# Patient Record
Sex: FEMALE | Race: BLACK OR AFRICAN AMERICAN | URBAN - METROPOLITAN AREA
[De-identification: names, ages, dates, MRNs, and addresses within clinical notes are randomized per-mention and may not be internally consistent; named-entity substitution may affect disease eponyms.]

---

## 2017-03-01 ENCOUNTER — TELEPHONE (OUTPATIENT)
Dept: FAMILY MEDICINE CLINIC | Age: 57
End: 2017-03-01

## 2017-03-01 DIAGNOSIS — E78.5 HYPERLIPIDEMIA, UNSPECIFIED HYPERLIPIDEMIA TYPE: Primary | ICD-10-CM

## 2017-03-22 ENCOUNTER — OFFICE VISIT (OUTPATIENT)
Dept: FAMILY MEDICINE CLINIC | Age: 57
End: 2017-03-22

## 2017-03-22 VITALS
SYSTOLIC BLOOD PRESSURE: 120 MMHG | TEMPERATURE: 98.5 F | HEART RATE: 82 BPM | OXYGEN SATURATION: 99 % | HEIGHT: 67 IN | BODY MASS INDEX: 15.07 KG/M2 | RESPIRATION RATE: 16 BRPM | DIASTOLIC BLOOD PRESSURE: 79 MMHG | WEIGHT: 96 LBS

## 2017-03-22 DIAGNOSIS — J98.4 CHRONIC LUNG DISEASE: ICD-10-CM

## 2017-03-22 DIAGNOSIS — M24.811 INTERNAL DERANGEMENT OF SHOULDER, RIGHT: ICD-10-CM

## 2017-03-22 DIAGNOSIS — K27.9 PEPTIC ULCER DISEASE: Primary | ICD-10-CM

## 2017-03-22 DIAGNOSIS — Z12.11 ENCOUNTER FOR SCREENING COLONOSCOPY: ICD-10-CM

## 2017-03-22 DIAGNOSIS — C44.702: ICD-10-CM

## 2017-03-22 LAB — HGB BLD-MCNC: 12.2 G/DL

## 2017-03-22 RX ORDER — OMEPRAZOLE 40 MG/1
40 CAPSULE, DELAYED RELEASE ORAL DAILY
Qty: 30 CAP | Refills: 0 | Status: SHIPPED | OUTPATIENT
Start: 2017-03-22 | End: 2017-09-06 | Stop reason: SDUPTHER

## 2017-03-22 RX ORDER — HYDROXYZINE 25 MG/1
25 TABLET, FILM COATED ORAL
Qty: 30 TAB | Refills: 1 | Status: SHIPPED | OUTPATIENT
Start: 2017-03-22 | End: 2017-04-01

## 2017-03-22 NOTE — MR AVS SNAPSHOT
Visit Information Date & Time Provider Department Dept. Phone Encounter #  
 3/22/2017  3:45 PM Kenia Davis MD [address] [place] [address] [phone] [id number] Follow-up Instructions Return if symptoms worsen or fail to improve. Upcoming Health Maintenance Date Due Hepatitis C Screening 1960 DTaP/Tdap/Td series (1 - Tdap) 11/14/1979 PAP AKA CERVICAL CYTOLOGY 11/14/1979 BREAST CANCER SCRN MAMMOGRAM 11/14/2008 FOBT Q 1 YEAR AGE 50-75 11/14/2008 INFLUENZA AGE 9 TO ADULT 8/1/2016 Allergies as of 3/22/2017  Review Complete On: 3/22/2017 By: Kenia Davis MD  
  
 Severity Noted Reaction Type Reactions Penicillins  03/01/2016    Unknown (comments) Current Immunizations  Never Reviewed No immunizations on file. Not reviewed this visit You Were Diagnosed With   
  
 Codes Comments Peptic ulcer disease    -  Primary ICD-10-CM: K27.9 ICD-9-CM: 533.90 Chronic lung disease     ICD-10-CM: J98.4 ICD-9-CM: 518.89 Internal derangement of shoulder, right     ICD-10-CM: M24.811 ICD-9-CM: 719.81 Malignant neoplasm skin of leg, right     ICD-10-CM: C44.702 ICD-9-CM: 173.70 Vitals BP Pulse Temp Resp Height(growth percentile) Weight(growth percentile) 120/79 (BP 1 Location: Right arm, BP Patient Position: Sitting) 82 98.5 °F (36.9 °C) (Oral) 16 5' 7\" (1.702 m) 96 lb (43.5 kg) SpO2 BMI Smoking Status 99% 15.04 kg/m2 Current Every Day Smoker BMI and BSA Data Body Mass Index Body Surface Area 15.04 kg/m 2 1.43 m 2 Preferred Pharmacy Pharmacy Name Phone [facility] [address] [place] [address] [phone] Your Updated Medication List  
  
   
This list is accurate as of: 3/22/17  4:38 PM.  Always use your most recent med list.  
  
  
  
  
 albuterol 90 mcg/actuation inhaler Commonly known as:  PROVENTIL HFA, VENTOLIN HFA, PROAIR HFA Take  by inhalation. aspirin 325 mg tablet Commonly known as:  ASPIRIN Take 325 mg by mouth daily. atorvastatin 20 mg tablet Commonly known as:  LIPITOR Take 1 Tab by mouth daily. clotrimazole-betamethasone 1-0.05 % lotion Commonly known as:  Genny Roya Apply  to affected area two (2) times a day. cyclobenzaprine 10 mg tablet Commonly known as:  FLEXERIL Take 1 Tab by mouth three (3) times daily as needed for Muscle Spasm(s). hydrOXYzine HCl 25 mg tablet Commonly known as:  ATARAX Take  by mouth three (3) times daily as needed for Itching. ibuprofen 800 mg tablet Commonly known as:  MOTRIN Take  by mouth.  
  
 lidocaine 5 % Commonly known as:  Patrici Copas Apply 1-2 patches to the affected area for 12 hours a day and remove for 12 hours a day. lithium carbonate 300 mg capsule Take  by mouth three (3) times daily. omeprazole 40 mg capsule Commonly known as:  PRILOSEC Take 1 Cap by mouth two (2) times a day. oxyCODONE-acetaminophen 5-325 mg per tablet Commonly known as:  PERCOCET Take 1 Tab by mouth every eight (8) hours as needed for Pain. Max Daily Amount: 3 Tabs. Indications: PAIN We Performed the Following REFERRAL TO DERMATOLOGY [REF19 Custom] Comments:  
 Please evaluate patient for malignant skin lesion. REFERRAL TO GASTROENTEROLOGY [RQB62 Custom] Comments:  
 Please evaluate patient for peptic ulcer disease. REFERRAL TO PHYSICAL THERAPY [AJV75 Custom] Comments:  
 Please evaluate patient for shoulder derangement. REFERRAL TO THORACIC (NON-CARDIAC) SURGERY [GAF086 Custom] Comments:  
 Please evaluate patient for chronic lung disease. Follow-up Instructions Return if symptoms worsen or fail to improve. Referral Information Referral ID Referred By Referred To [id number] Benedict Jackson MD   
   [address] [place], [address] Phone: [phone] Fax: [phone] Visits Status Start Date End Date 4 New Request 3/22/17 3/22/18 If your referral has a status of pending review or denied, additional information will be sent to support the outcome of this decision. Referral ID Referred By Referred To  
 [id number] Wyn Goltz, MD  
   [address] [place] [address] [facility] [place] [address] Phone: [phone] Fax: [phone] Visits Status Start Date End Date 4 New Request 3/22/17 3/22/18 If your referral has a status of pending review or denied, additional information will be sent to support the outcome of this decision. Referral ID Referred By Referred To  
 [id number] Martine Jona, [address] [place], [address] [facility] Phone: [phone] Fax: [phone] Visits Status Start Date End Date 3 New Request 3/22/17 3/22/18 If your referral has a status of pending review or denied, additional information will be sent to support the outcome of this decision. Referral ID Referred By Referred To  
 [id number] [facility] [address] [facility]  
   [address] [place], [address] [facility] Phone: [phone] Fax: [phone] Visits Status Start Date End Date  
 7 New Request 3/22/17 3/22/18 If your referral has a status of pending review or denied, additional information will be sent to support the outcome of this decision. Introducing [facility]! Dear Rc Gillette: 
Thank you for requesting a Notis.tv account. Our records indicate that you already have an active [email or web address] account. You can access your account anytime at [email or web address] Did you know that you can access your hospital and ER discharge instructions at any time in [employer]? You can also review all of your test results from your hospital stay or ER visit. Additional Information If you have questions, please visit the Frequently Asked Questions section of the [employer] website at [email or web address] Remember, [employer] is NOT to be used for urgent needs. For medical emergencies, dial 911. Now available from your iPhone and Android! Please provide this summary of care documentation to your next provider. Your primary care clinician is listed as Jaycob Wilcox. If you have any questions after today's visit, please call [phone].

## 2017-03-22 NOTE — PROGRESS NOTES
HISTORY OF PRESENT ILLNESS  Amanda Pollack is a 60 y.o. female. HPI  Chronic lung disease due for pulmonary eval  Ulcerated lesion in duodenum, has not seen GI yet  C/o skin lesion r leg  Shoulder derangement r in pain  Review of Systems   Constitutional: Positive for weight loss. Gastrointestinal: Positive for abdominal pain and nausea. Musculoskeletal: Positive for joint pain. All other systems reviewed and are negative. Past Medical History:   Diagnosis Date    Depression     Stroke ([facility] [address] 04/15       Current Outpatient Prescriptions:     atorvastatin (LIPITOR) 20 mg tablet, Take 1 Tab by mouth daily. , Disp: , Rfl: 5    omeprazole (PRILOSEC) 40 mg capsule, Take 1 Cap by mouth two (2) times a day., Disp: 180 Cap, Rfl: 3    cyclobenzaprine (FLEXERIL) 10 mg tablet, Take 1 Tab by mouth three (3) times daily as needed for Muscle Spasm(s). , Disp: 90 Tab, Rfl: 0    lidocaine (LIDODERM) 5 %, Apply 1-2 patches to the affected area for 12 hours a day and remove for 12 hours a day., Disp: 60 Each, Rfl: 3    ibuprofen (MOTRIN) 800 mg tablet, Take  by mouth., Disp: , Rfl:     clotrimazole-betamethasone (LOTRISONE) 1-0.05 % lotion, Apply  to affected area two (2) times a day., Disp: , Rfl:     albuterol (PROVENTIL HFA, VENTOLIN HFA, PROAIR HFA) 90 mcg/actuation inhaler, Take  by inhalation. , Disp: , Rfl:     aspirin (ASPIRIN) 325 mg tablet, Take 325 mg by mouth daily. , Disp: , Rfl:     lithium carbonate 300 mg capsule, Take  by mouth three (3) times daily. , Disp: , Rfl:     hydrOXYzine (ATARAX) 25 mg tablet, Take  by mouth three (3) times daily as needed for Itching., Disp: , Rfl:     oxyCODONE-acetaminophen (PERCOCET) 5-325 mg per tablet, Take 1 Tab by mouth every eight (8) hours as needed for Pain. Max Daily Amount: 3 Tabs.  Indications: PAIN, Disp: 50 Tab, Rfl: 0  Visit Vitals    /79 (BP 1 Location: Right arm, BP Patient Position: Sitting)    Pulse 82    Temp 98.5 °F (36.9 °C) (Oral)    Resp 16    Ht 5' 7\" (1.702 m)    Wt 96 lb (43.5 kg)    SpO2 99%    BMI 15.04 kg/m2         Physical Exam   Constitutional: She appears well-developed and well-nourished. No distress. Cardiovascular: Normal rate, normal heart sounds and intact distal pulses. Exam reveals no gallop and no friction rub. No murmur heard. Pulmonary/Chest: Effort normal and breath sounds normal. No respiratory distress. She has no wheezes. She has no rales. She exhibits no tenderness. Abdominal: Soft. Bowel sounds are normal. She exhibits no distension and no mass. There is tenderness. There is guarding. There is no rebound. Tenderness and guarding upper abdomen   Skin: Skin is warm and dry. No rash noted. She is not diaphoretic. No erythema. No pallor.    Complex quarter sized lesion on r leg  Suspicious for malignancy   Vitals reviewed.  hgb-    ASSESSMENT and PLAN  chronic lung disease   Peptic ulcer disease  Malignant skin lesion  Shoulder derangement  Plan  Refer to PT  Refer to GI  Refer to thoracic  Omeprazole 40 mg  Refer to derm

## 2017-03-22 NOTE — PROGRESS NOTES
Amanda Steiner is a 60 y.o. female who presents today for annual wellness exam. Pain scale 0/10    Health Maintenance Due   Topic Date Due    Hepatitis C Screening  1960    Pneumococcal 19-64 Medium Risk (1 of 1 - PPSV23) 11/14/1977    DTaP/Tdap/Td series (1 - Tdap) 11/14/1979    PAP AKA CERVICAL CYTOLOGY  11/14/1979    BREAST CANCER SCRN MAMMOGRAM  11/14/2008    FOBT Q 1 YEAR AGE 50-75  11/14/2008    INFLUENZA AGE 9 TO ADULT  08/01/2016         1. Have you been to the ER, urgent care clinic since your last visit? Hospitalized since your last visit? No    2. Have you seen or consulted any other health care providers outside of the [address] [place] [address] since your last visit? Include any pap smears or colon screening. No    Learning Assessment 3/1/2016   PRIMARY LEARNER Patient   HIGHEST LEVEL OF EDUCATION - PRIMARY LEARNER  SOME COLLEGE   BARRIERS PRIMARY LEARNER NONE   CO-LEARNER CAREGIVER No   PRIMARY LANGUAGE ENGLISH   LEARNER PREFERENCE PRIMARY DEMONSTRATION   ANSWERED BY patient   RELATIONSHIP SELF       Abuse Screening Questionnaire 3/1/2016   Do you ever feel afraid of your partner? N   Are you in a relationship with someone who physically or mentally threatens you? N   Is it safe for you to go home?  Molly Blackwood

## 2017-03-23 ENCOUNTER — TELEPHONE (OUTPATIENT)
Dept: FAMILY MEDICINE CLINIC | Age: 57
End: 2017-03-23

## 2017-03-23 ENCOUNTER — HOSPITAL ENCOUNTER (OUTPATIENT)
Dept: LAB | Age: 57
Discharge: HOME | End: 2017-03-23
Payer: MEDICARE

## 2017-03-23 DIAGNOSIS — E78.5 HYPERLIPIDEMIA, UNSPECIFIED HYPERLIPIDEMIA TYPE: ICD-10-CM

## 2017-03-23 LAB
ALBUMIN SERPL BCP-MCNC: 4.1 G/DL (ref 3.4–5)
ALBUMIN/GLOB SERPL: 1.4 {RATIO} (ref 0.8–1.7)
ALP SERPL-CCNC: 81 U/L (ref 45–117)
ALT SERPL-CCNC: 20 U/L (ref 13–56)
ANION GAP BLD CALC-SCNC: 6 MMOL/L (ref 3–18)
AST SERPL W P-5'-P-CCNC: 18 U/L (ref 15–37)
BASOPHILS # BLD AUTO: 0.1 K/UL (ref 0–0.06)
BASOPHILS # BLD: 1 % (ref 0–2)
BILIRUB SERPL-MCNC: 0.4 MG/DL (ref 0.2–1)
BUN SERPL-MCNC: 9 MG/DL (ref 7–18)
BUN/CREAT SERPL: 17 (ref 12–20)
CALCIUM SERPL-MCNC: 8.9 MG/DL (ref 8.5–10.1)
CHLORIDE SERPL-SCNC: 105 MMOL/L (ref 100–108)
CHOLEST SERPL-MCNC: 201 MG/DL
CO2 SERPL-SCNC: 30 MMOL/L (ref 21–32)
CREAT SERPL-MCNC: 0.54 MG/DL (ref 0.6–1.3)
DIFFERENTIAL METHOD BLD: ABNORMAL
EOSINOPHIL # BLD: 0 K/UL (ref 0–0.4)
EOSINOPHIL NFR BLD: 1 % (ref 0–5)
ERYTHROCYTE [DISTWIDTH] IN BLOOD BY AUTOMATED COUNT: 13.7 % (ref 11.6–14.5)
GLOBULIN SER CALC-MCNC: 2.9 G/DL (ref 2–4)
GLUCOSE SERPL-MCNC: 80 MG/DL (ref 74–99)
HCT VFR BLD AUTO: 38.4 % (ref 35–45)
HDLC SERPL-MCNC: 77 MG/DL (ref 40–60)
HDLC SERPL: 2.6 {RATIO} (ref 0–5)
HGB BLD-MCNC: 12.7 G/DL (ref 12–16)
LDLC SERPL CALC-MCNC: 110.6 MG/DL (ref 0–100)
LIPID PROFILE,FLP: ABNORMAL
LYMPHOCYTES # BLD AUTO: 42 % (ref 21–52)
LYMPHOCYTES # BLD: 2.2 K/UL (ref 0.9–3.6)
MCH RBC QN AUTO: 32.2 PG (ref 24–34)
MCHC RBC AUTO-ENTMCNC: 33.1 G/DL (ref 31–37)
MCV RBC AUTO: 97.5 FL (ref 74–97)
MONOCYTES # BLD: 0.4 K/UL (ref 0.05–1.2)
MONOCYTES NFR BLD AUTO: 7 % (ref 3–10)
NEUTS SEG # BLD: 2.6 K/UL (ref 1.8–8)
NEUTS SEG NFR BLD AUTO: 49 % (ref 40–73)
PLATELET # BLD AUTO: 359 K/UL (ref 135–420)
PMV BLD AUTO: 9.1 FL (ref 9.2–11.8)
POTASSIUM SERPL-SCNC: 4.3 MMOL/L (ref 3.5–5.5)
PROT SERPL-MCNC: 7 G/DL (ref 6.4–8.2)
PSA SERPL-MCNC: <0.1 NG/ML
RBC # BLD AUTO: 3.94 M/UL (ref 4.2–5.3)
SODIUM SERPL-SCNC: 141 MMOL/L (ref 136–145)
T4 FREE SERPL-MCNC: 0.9 NG/DL (ref 0.7–1.5)
TRIGL SERPL-MCNC: 67 MG/DL (ref ?–150)
TSH SERPL DL<=0.05 MIU/L-ACNC: 0.99 UIU/ML (ref 0.36–3.74)
VLDLC SERPL CALC-MCNC: 13.4 MG/DL
WBC # BLD AUTO: 5.3 K/UL (ref 4.6–13.2)

## 2017-03-23 PROCEDURE — 85025 COMPLETE CBC W/AUTO DIFF WBC: CPT | Performed by: INTERNAL MEDICINE

## 2017-03-23 PROCEDURE — 80053 COMPREHEN METABOLIC PANEL: CPT | Performed by: INTERNAL MEDICINE

## 2017-03-23 PROCEDURE — 80061 LIPID PANEL: CPT | Performed by: INTERNAL MEDICINE

## 2017-03-23 PROCEDURE — 84439 ASSAY OF FREE THYROXINE: CPT | Performed by: INTERNAL MEDICINE

## 2017-03-23 PROCEDURE — 84443 ASSAY THYROID STIM HORMONE: CPT | Performed by: INTERNAL MEDICINE

## 2017-03-23 PROCEDURE — 36415 COLL VENOUS BLD VENIPUNCTURE: CPT | Performed by: INTERNAL MEDICINE

## 2017-03-23 PROCEDURE — 84153 ASSAY OF PSA TOTAL: CPT | Performed by: INTERNAL MEDICINE

## 2017-03-23 NOTE — TELEPHONE ENCOUNTER
[facility] called stating they received an order that states \"evaluate for pulmonary disease\" she is wondering if this was in error or if there is something specific Dr. Philomena Chowdhury thought surgery could do.     Carolina Power [phone]

## 2017-03-27 ENCOUNTER — TELEPHONE (OUTPATIENT)
Dept: FAMILY MEDICINE CLINIC | Age: 57
End: 2017-03-27

## 2017-04-03 ENCOUNTER — TELEPHONE (OUTPATIENT)
Dept: FAMILY MEDICINE CLINIC | Age: 57
End: 2017-04-03

## 2017-04-03 NOTE — TELEPHONE ENCOUNTER
Pt called again stating she is vomiting blood. Stated she cannot wait in the ER. Advised to call 911. Pt stated she is going to drive herself as an ambulance is \"embarassing\". Advised we do not have control over the possible wait time in the ER.

## 2017-08-16 ENCOUNTER — TELEPHONE (OUTPATIENT)
Dept: FAMILY MEDICINE CLINIC | Age: 57
End: 2017-08-16

## 2017-08-16 DIAGNOSIS — E55.9 VITAMIN D DEFICIENCY: ICD-10-CM

## 2017-08-16 DIAGNOSIS — E78.5 HYPERLIPIDEMIA, UNSPECIFIED: Primary | ICD-10-CM

## 2017-08-16 NOTE — TELEPHONE ENCOUNTER
Pt has a mwv scheduled 8/22 and is requesting her bw to be ordered before her appt.  Please review and order accordingly

## 2017-09-06 RX ORDER — CYCLOBENZAPRINE HCL 10 MG
10 TABLET ORAL
Qty: 90 TAB | Refills: 0 | Status: SHIPPED | OUTPATIENT
Start: 2017-09-06 | End: 2017-10-01 | Stop reason: SDUPTHER

## 2017-09-06 RX ORDER — OMEPRAZOLE 40 MG/1
40 CAPSULE, DELAYED RELEASE ORAL DAILY
Qty: 30 CAP | Refills: 0 | Status: SHIPPED | OUTPATIENT
Start: 2017-09-06 | End: 2018-03-08 | Stop reason: SDUPTHER

## 2017-09-06 NOTE — TELEPHONE ENCOUNTER
From: Jazmin Bach  To: Catina Parikh MD  Sent: 9/6/2017 11:38 AM EDT  Subject: Medication Renewal Request    Original authorizing provider: MD Jazmin Vance would like a refill of the following medications:  cyclobenzaprine (FLEXERIL) 10 mg tablet Catina Parikh MD]  omeprazole (PRILOSEC) 40 mg capsule Catina Parikh MD]    Preferred pharmacy: [address]    Comment:

## 2017-10-01 DIAGNOSIS — M25.812 OS ACROMIALE, LEFT: ICD-10-CM

## 2017-10-01 DIAGNOSIS — M75.52 SHOULDER BURSITIS, LEFT: ICD-10-CM

## 2017-10-01 DIAGNOSIS — M75.42 SHOULDER IMPINGEMENT SYNDROME, LEFT: ICD-10-CM

## 2017-10-01 RX ORDER — OXYCODONE AND ACETAMINOPHEN 5; 325 MG/1; MG/1
1 TABLET ORAL
Qty: 50 TAB | Refills: 0 | Status: CANCELLED | OUTPATIENT
Start: 2017-10-01

## 2017-10-02 RX ORDER — LIDOCAINE 50 MG/G
PATCH TOPICAL
Qty: 60 EACH | Refills: 3 | Status: SHIPPED | OUTPATIENT
Start: 2017-10-02

## 2017-10-02 RX ORDER — CYCLOBENZAPRINE HCL 10 MG
10 TABLET ORAL
Qty: 90 TAB | Refills: 0 | Status: SHIPPED | OUTPATIENT
Start: 2017-10-02 | End: 2017-11-02 | Stop reason: SDUPTHER

## 2017-10-02 NOTE — TELEPHONE ENCOUNTER
From: Olimpia Grimes  To: Austin Gillette MD  Sent: 10/1/2017 2:32 PM EDT  Subject: Medication Renewal Request    Original authorizing provider: MD Olimpia York would like a refill of the following medications:  lidocaine (LIDODERM) 5 % Austin Gillette MD]  cyclobenzaprine (FLEXERIL) 10 mg tablet Austin Gillette MD]    Preferred pharmacy: [address]    Comment:      Medication renewals requested in this message routed to other providers:  oxyCODONE-acetaminophen (PERCOCET) 5-325 mg per tablet Eden Hernandez MD]

## 2017-10-05 RX ORDER — IBUPROFEN 800 MG/1
800 TABLET ORAL
Qty: 90 TAB | Refills: 3 | Status: SHIPPED | OUTPATIENT
Start: 2017-10-05

## 2017-10-05 NOTE — TELEPHONE ENCOUNTER
From: Cindy Cutler  Sent: 10/1/2017 2:32 PM EDT  Subject: Medication Renewal Request    Original authorizing provider: MD Cindy Sales would like a refill of the following medications:  oxyCODONE-acetaminophen (PERCOCET) 5-325 mg per tablet Rob Paz MD]    Preferred pharmacy: [address]    Comment:      Medication renewals requested in this message routed to other providers:  lidocaine (LIDODERM) 5 % Keisha Capellan MD]  cyclobenzaprine (FLEXERIL) 10 mg tablet Keisha Capellan MD]

## 2017-10-13 DIAGNOSIS — M75.40 IMPINGEMENT SYNDROME OF SHOULDER REGION, UNSPECIFIED LATERALITY: Primary | ICD-10-CM

## 2017-11-02 RX ORDER — CYCLOBENZAPRINE HCL 10 MG
TABLET ORAL
Qty: 90 TAB | Refills: 0 | Status: SHIPPED | OUTPATIENT
Start: 2017-11-02

## 2018-03-08 RX ORDER — HYDROXYZINE 25 MG/1
25 TABLET, FILM COATED ORAL
Qty: 90 TAB | Refills: 0 | Status: SHIPPED | OUTPATIENT
Start: 2018-03-08 | End: 2018-08-27 | Stop reason: SDUPTHER

## 2018-03-08 RX ORDER — OMEPRAZOLE 40 MG/1
40 CAPSULE, DELAYED RELEASE ORAL DAILY
Qty: 90 CAP | Refills: 0 | Status: SHIPPED | OUTPATIENT
Start: 2018-03-08 | End: 2018-05-14 | Stop reason: SDUPTHER

## 2018-05-01 ENCOUNTER — TELEPHONE (OUTPATIENT)
Dept: FAMILY MEDICINE CLINIC | Age: 58
End: 2018-05-01

## 2018-05-01 NOTE — TELEPHONE ENCOUNTER
Patient called office, she was at [facility] with weakness in her R arm x 2 days wishing to be seen, she was told there would be a wait and she contacted the office. Patient drove herself and was advised by me to not drive and to stay and wait for the physician. She stated she was going home, I offered her to be seen by me if she could safely get to the office the PM, but she refused and restated she was going home.   Patient was encouraged to contact the office in the AM and I would be happy to see her Wednesday

## 2018-05-01 NOTE — TELEPHONE ENCOUNTER
Pt called us while she was in the emergency room at [facility] because she believes she is having a stroke due to right arm numbness. She wanted to talk to Dr Sapphire Johnson because they Charles Freedman making me wait\"     I spoke to the nurse and she advised me to tell the pt to stay at the emergency room since she thinks she is having stroke-like symptoms. I informed pt of this and she became argumentative stating how they want her to wait \"with 50 other people! \" and she wanted us to call them and tell them that she is in the parking lot waiting. I advised the patient that she needed to stay in the emergency room and she refused demanding to speak with Dr Sapphire Johnson. I offered to leave a message and have him call her back because he is seeing patients but she refused and said that she would wait on hold and is still waiting.

## 2018-05-02 ENCOUNTER — TELEPHONE (OUTPATIENT)
Dept: FAMILY MEDICINE CLINIC | Age: 58
End: 2018-05-02

## 2018-05-02 NOTE — TELEPHONE ENCOUNTER
Patient called requesting that we call [facility] emergency room and tell them to take her back immediately when she arrives to the ER for stroke symptoms. I advised patient that we can not force the emergency room to evaluate her on the spot that she needs to check in and they will assess the urgency and decide if she needs to be taken back immediately or not. Pt states \"I don't give a damn, he is my doctor he needs to call them and tell them to take me back so I do not have to sit in the waiting room with 25 other people\"   I advised again, that we do not have control over the emergency room. She stated \"I am so done with this\" and disconnected the call.

## 2018-05-02 NOTE — TELEPHONE ENCOUNTER
Per notes: 5/1/18    Today, 5/2/18, pt called stating she believes she is having a stroke due to seeing sparkling with sight. I then was advised by Gris Sotelo LPN to let pt know to go to the emergency room since she is experiencing stroke like symptoms. Pt then asked if she could be seen right away or is she to wait with 40 people like she did on yesterday. I then advised the pt since she is experiencing stroke like symptoms and her PCP requested she'd be seen the emergency room may take her back right away, that I wasn't sure. Pt started to become upset in which I called Dominique and advised her on the concern. Pt was then transferred to Gris Sotelo to advise pt on concern.

## 2018-05-03 NOTE — TELEPHONE ENCOUNTER
Checked care everywhere and patient did go to [facility] ER yesterday and was seen. They ran EKG, xrays, and CT of her head.  They did find some changes compared to CT from 2015

## 2018-05-08 ENCOUNTER — PATIENT OUTREACH (OUTPATIENT)
Dept: FAMILY MEDICINE CLINIC | Age: 58
End: 2018-05-08

## 2018-05-09 ENCOUNTER — PATIENT OUTREACH (OUTPATIENT)
Dept: FAMILY MEDICINE CLINIC | Age: 58
End: 2018-05-09

## 2018-05-09 NOTE — Clinical Note
Patient admitted to [facility] 5/2-4/2018 did leave the ED on 5/2 to return again after midnight for ; Acute CVA Cocaine use SHe made the f/u appointment.  I tried calling her no answer so for now it will be just a hospital follow up on 5/11 @ 2:00 Smoking Alcohol intoxication

## 2018-05-09 NOTE — PROGRESS NOTES
.  Hospital Discharge Follow-Up      Date/Time:  5/9/2018 12:22 PM    Patient was admitted to [facility] on 5/3 and discharged on 5/4/2018 for CVA. The physician discharge summary was available at the time of outreach. This was NN second attempt to reach patient for MOSES ROBINA  Case management assessment, had to leave a message as she did say her full name on the answering recording. Patient did call the office for a hospital visit appointment it is set up for 5/11 at 2 pm. NN did leave that information as well on her machine. Top Challenges reviewed with the provider   Acute CVA  Cocaine use  Smoking  Alcohol intoxication         Method of communication with provider :chart routing    Inpatient RRAT score: .5 low    Was this a readmission? no   Patient stated reason for the readmission: N/A    Barriers to care? Substance abuse, Smoking, Alcohol and cocaine,, depression, level of motivation    Advance Care Planning:   Does patient have an Advance Directive:  not on file       Medication reconciliation needs to be completed         Current Outpatient Prescriptions   Medication Sig    omeprazole (PRILOSEC) 40 mg capsule Take 1 Cap by mouth daily.  hydrOXYzine HCl (ATARAX) 25 mg tablet Take 1 Tab by mouth three (3) times daily as needed for Itching.  cyclobenzaprine (FLEXERIL) 10 mg tablet TAKE ONE TABLET BY MOUTH THREE TIMES DAILY AS NEEDED FOR MUSCLE SPASM     ibuprofen (MOTRIN) 800 mg tablet Take 1 Tab by mouth every eight (8) hours as needed for Pain.  lidocaine (LIDODERM) 5 % Apply 1-2 patches to the affected area for 12 hours a day and remove for 12 hours a day.  atorvastatin (LIPITOR) 20 mg tablet Take 1 Tab by mouth daily.  oxyCODONE-acetaminophen (PERCOCET) 5-325 mg per tablet Take 1 Tab by mouth every eight (8) hours as needed for Pain. Max Daily Amount: 3 Tabs. Indications: PAIN    omeprazole (PRILOSEC) 40 mg capsule Take 1 Cap by mouth two (2) times a day.     clotrimazole-betamethasone (LOTRISONE) 1-0.05 % lotion Apply  to affected area two (2) times a day.  albuterol (PROVENTIL HFA, VENTOLIN HFA, PROAIR HFA) 90 mcg/actuation inhaler Take  by inhalation.  aspirin (ASPIRIN) 325 mg tablet Take 325 mg by mouth daily.  lithium carbonate 300 mg capsule Take  by mouth three (3) times daily. No current facility-administered medications for this visit. There are no discontinued medications.     PCP/Specialist follow up: Future Appointments  Date Time Provider Rex Hidalgo   5/11/2018 2:00 PM Favio Serrano MD [facility]

## 2018-05-14 ENCOUNTER — OFFICE VISIT (OUTPATIENT)
Dept: FAMILY MEDICINE CLINIC | Age: 58
End: 2018-05-14

## 2018-05-14 VITALS
OXYGEN SATURATION: 96 % | TEMPERATURE: 98.8 F | HEIGHT: 67 IN | RESPIRATION RATE: 16 BRPM | BODY MASS INDEX: 13.94 KG/M2 | SYSTOLIC BLOOD PRESSURE: 107 MMHG | WEIGHT: 88.8 LBS | DIASTOLIC BLOOD PRESSURE: 70 MMHG | HEART RATE: 79 BPM

## 2018-05-14 DIAGNOSIS — I69.359 CVA, OLD, HEMIPARESIS (HCC): Primary | ICD-10-CM

## 2018-05-14 DIAGNOSIS — Z72.0 TOBACCO USE: ICD-10-CM

## 2018-05-14 DIAGNOSIS — R29.898 RIGHT ARM WEAKNESS: ICD-10-CM

## 2018-05-14 DIAGNOSIS — J43.8 OTHER EMPHYSEMA (HCC): ICD-10-CM

## 2018-05-14 DIAGNOSIS — Q21.12 PATENT FORAMEN OVALE: ICD-10-CM

## 2018-05-14 PROBLEM — K27.9 PEPTIC ULCER DISEASE: Status: ACTIVE | Noted: 2018-05-14

## 2018-05-14 RX ORDER — VARENICLINE TARTRATE 1 MG/1
1 TABLET, FILM COATED ORAL 2 TIMES DAILY
Qty: 60 TAB | Refills: 2 | Status: SHIPPED | OUTPATIENT
Start: 2018-05-14

## 2018-05-14 RX ORDER — OMEPRAZOLE 40 MG/1
40 CAPSULE, DELAYED RELEASE ORAL DAILY
Qty: 90 CAP | Refills: 0 | Status: SHIPPED | OUTPATIENT
Start: 2018-05-14

## 2018-05-14 RX ORDER — ASPIRIN 81 MG/1
162 TABLET ORAL DAILY
Qty: 100 TAB | Refills: 3
Start: 2018-05-14

## 2018-05-14 NOTE — PROGRESS NOTES
Amanda Coppola is a 59 y.o. female (: 1960) presenting to address:    Chief Complaint   Patient presents with   [facility] Follow Up     pain scale 0/10       Vitals:    18 1109   BP: 107/70   Pulse: 79   Resp: 16   Temp: 98.8 °F (37.1 °C)   TempSrc: Oral   SpO2: 96%   Weight: 88 lb 12.8 oz (40.3 kg)   Height: 5' 7\" (1.702 m)   PainSc:   0 - No pain       Hearing/Vision:   No exam data present    Learning Assessment:     Learning Assessment 3/1/2016   PRIMARY LEARNER Patient   HIGHEST LEVEL OF EDUCATION - PRIMARY LEARNER  SOME COLLEGE   BARRIERS PRIMARY LEARNER NONE   CO-LEARNER CAREGIVER No   PRIMARY LANGUAGE ENGLISH   LEARNER PREFERENCE PRIMARY DEMONSTRATION   ANSWERED BY patient   RELATIONSHIP SELF     Depression Screening:     PHQ over the last two weeks 2018   PHQ Not Done Active Diagnosis of Depression or Bipolar Disorder   Little interest or pleasure in doing things -   Feeling down, depressed or hopeless -   Total Score PHQ 2 -     Fall Risk Assessment:   No flowsheet data found. Abuse Screening:     Abuse Screening Questionnaire 3/1/2016   Do you ever feel afraid of your partner? N   Are you in a relationship with someone who physically or mentally threatens you? N   Is it safe for you to go home? Y     Coordination of Care Questionaire:   1. Have you been to the ER, urgent care clinic since your last visit? Hospitalized since your last visit? YES ana    2. Have you seen or consulted any other health care providers outside of the [address] [place] [address] since your last visit? Include any pap smears or colon screening. YES podiatrist    Advanced Directive:   1. Do you have an Advanced Directive? NO    2. Would you like information on Advanced Directives?  NO

## 2018-05-14 NOTE — PROGRESS NOTES
Discussed the patient's BMI with her. The BMI follow up plan is as follows:     weight gain advised  nutrition/feeding management  dietary management education, guidance, and counseling    An After Visit Summary was printed and given to the patient.

## 2018-05-14 NOTE — PROGRESS NOTES
HISTORY OF PRESENT ILLNESS  Amanda Mcneil is a 59 y.o. female. HPI  Recent hospitalization for cva  Presented with r arm weakness and some scotomas  r arm weakness persists  Copd stable  Review of Systems   Neurological: Positive for focal weakness. All other systems reviewed and are negative. Past Medical History:   Diagnosis Date    Depression     Stroke [facility]) 04/15     Current Outpatient Prescriptions on File Prior to Visit   Medication Sig Dispense Refill    omeprazole (PRILOSEC) 40 mg capsule Take 1 Cap by mouth daily. 90 Cap 0    hydrOXYzine HCl (ATARAX) 25 mg tablet Take 1 Tab by mouth three (3) times daily as needed for Itching. 90 Tab 0    cyclobenzaprine (FLEXERIL) 10 mg tablet TAKE ONE TABLET BY MOUTH THREE TIMES DAILY AS NEEDED FOR MUSCLE SPASM  90 Tab 0    ibuprofen (MOTRIN) 800 mg tablet Take 1 Tab by mouth every eight (8) hours as needed for Pain. 90 Tab 3    lidocaine (LIDODERM) 5 % Apply 1-2 patches to the affected area for 12 hours a day and remove for 12 hours a day. 60 Each 3    atorvastatin (LIPITOR) 20 mg tablet Take 1 Tab by mouth daily. 5    oxyCODONE-acetaminophen (PERCOCET) 5-325 mg per tablet Take 1 Tab by mouth every eight (8) hours as needed for Pain. Max Daily Amount: 3 Tabs. Indications: PAIN 50 Tab 0    omeprazole (PRILOSEC) 40 mg capsule Take 1 Cap by mouth two (2) times a day. 180 Cap 3    clotrimazole-betamethasone (LOTRISONE) 1-0.05 % lotion Apply  to affected area two (2) times a day.  albuterol (PROVENTIL HFA, VENTOLIN HFA, PROAIR HFA) 90 mcg/actuation inhaler Take  by inhalation.  aspirin (ASPIRIN) 325 mg tablet Take 325 mg by mouth daily.  lithium carbonate 300 mg capsule Take  by mouth three (3) times daily. No current facility-administered medications on file prior to visit.       Visit Vitals    /70 (BP 1 Location: Right arm, BP Patient Position: Sitting)    Pulse 79    Temp 98.8 °F (37.1 °C) (Oral)    Resp 16    Ht 5' 7\" (1.702 m)    Wt 88 lb 12.8 oz (40.3 kg)    SpO2 96%    BMI 13.91 kg/m2       Physical Exam   Constitutional: She is oriented to person, place, and time. She appears well-developed and well-nourished. No distress. Eyes: Conjunctivae and EOM are normal. Pupils are equal, round, and reactive to light. No scleral icterus. Neck: Normal range of motion. Neck supple. No thyromegaly present. Carotid upstroke normal  - bruits   Cardiovascular: Normal rate, regular rhythm, normal heart sounds and intact distal pulses. Exam reveals no gallop and no friction rub. No murmur heard. Pulmonary/Chest: Effort normal. No respiratory distress. She has no wheezes. She has no rales. She exhibits no tenderness. Musculoskeletal: Normal range of motion. She exhibits no edema, tenderness or deformity. Gait and balance normal   Lymphadenopathy:     She has no cervical adenopathy. Neurological: She is alert and oriented to person, place, and time. She displays abnormal reflex. No cranial nerve deficit. She exhibits normal muscle tone. Coordination normal.   dtr decreased on R arm  --dysmetria  Slightly weak r hand    Skin: Skin is warm and dry. No rash noted. She is not diaphoretic. No erythema. No pallor. Psychiatric: She has a normal mood and affect. Her behavior is normal. Judgment and thought content normal.   Vitals reviewed.   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

## 2018-05-14 NOTE — MR AVS SNAPSHOT
[address] [address] [facility] [id number] [phone] Patient: Vangie Guevara MRN: [id number] [facility]:40/54/1225 Visit Information Date & Time Provider Department Dept. Phone Encounter #  
 5/14/2018 11:15 AM Aleida Graf, [address] [place] [phone] [id number] Follow-up Instructions Return pending outcomes. Follow-up and Disposition History Upcoming Health Maintenance Date Due Hepatitis C Screening 1960 DTaP/Tdap/Td series (1 - Tdap) 11/14/1979 PAP AKA CERVICAL CYTOLOGY 11/14/1979 BREAST CANCER SCRN MAMMOGRAM 11/14/2008 FOBT Q 1 YEAR AGE 50-75 11/14/2008 MEDICARE YEARLY EXAM 3/20/2018 Influenza Age 5 to Adult 8/1/2018 Allergies as of 5/14/2018  Review Complete On: 5/14/2018 By: Aleida Graf MD  
  
 Severity Noted Reaction Type Reactions Penicillins  03/01/2016    Unknown (comments) Current Immunizations  Never Reviewed No immunizations on file. Not reviewed this visit You Were Diagnosed With   
  
 Codes Comments CVA, old, hemiparesis ([facility] [address]    -  Primary ICD-10-CM: D35.746 ICD-9-CM: 438.20 Other emphysema ([facility] [address]     ICD-10-CM: J43.8 ICD-9-CM: 492.8 Tobacco use     ICD-10-CM: Z72.0 ICD-9-CM: 305.1 Right arm weakness     ICD-10-CM: R29.898 ICD-9-CM: 729.89 Patent foramen ovale     ICD-10-CM: Q21.1 ICD-9-CM: 690. 5 Vitals BP Pulse Temp Resp Height(growth percentile) Weight(growth percentile) 107/70 (BP 1 Location: Right arm, BP Patient Position: Sitting) 79 98.8 °F (37.1 °C) (Oral) 16 5' 7\" (1.702 m) 88 lb 12.8 oz (40.3 kg) SpO2 BMI Smoking Status 96% 13.91 kg/m2 Current Every Day Smoker BMI and BSA Data Body Mass Index Body Surface Area  
 13.91 kg/m 2 1.38 m 2 Preferred Pharmacy Pharmacy Name Phone [facility] [place] [address] [phone] Your Updated Medication List  
  
   
This list is accurate as of 5/14/18 12:07 PM.  Always use your most recent med list.  
  
  
  
  
 albuterol 90 mcg/actuation inhaler Commonly known as:  PROVENTIL HFA, VENTOLIN HFA, PROAIR HFA Take  by inhalation. aspirin delayed-release 81 mg tablet Take 2 Tabs by mouth daily. atorvastatin 20 mg tablet Commonly known as:  LIPITOR Take 1 Tab by mouth daily. clotrimazole-betamethasone 1-0.05 % lotion Commonly known as:  Lillette Mercury Apply  to affected area two (2) times a day. cyclobenzaprine 10 mg tablet Commonly known as:  FLEXERIL  
TAKE ONE TABLET BY MOUTH THREE TIMES DAILY AS NEEDED FOR MUSCLE SPASM  
  
 hydrOXYzine HCl 25 mg tablet Commonly known as:  ATARAX Take 1 Tab by mouth three (3) times daily as needed for Itching. ibuprofen 800 mg tablet Commonly known as:  MOTRIN Take 1 Tab by mouth every eight (8) hours as needed for Pain.  
  
 lidocaine 5 % Commonly known as:  Hulen Bruce Apply 1-2 patches to the affected area for 12 hours a day and remove for 12 hours a day. lithium carbonate 300 mg capsule Take  by mouth three (3) times daily. * omeprazole 40 mg capsule Commonly known as:  PRILOSEC Take 1 Cap by mouth two (2) times a day. * omeprazole 40 mg capsule Commonly known as:  PRILOSEC Take 1 Cap by mouth daily. oxyCODONE-acetaminophen 5-325 mg per tablet Commonly known as:  PERCOCET Take 1 Tab by mouth every eight (8) hours as needed for Pain. Max Daily Amount: 3 Tabs. Indications: PAIN  
  
 varenicline 1 mg tablet Commonly known as:  Nydia Sidle Take 1 Tab by mouth two (2) times a day. * Notice: This list has 2 medication(s) that are the same as other medications prescribed for you.  Read the directions carefully, and ask your doctor or other care provider to review them with you. Prescriptions Sent to Pharmacy Refills  
 varenicline (CHANTIX) 1 mg tablet 2 Sig: Take 1 Tab by mouth two (2) times a day. Class: Normal  
 Pharmacy: [facility] [address] JUAN ANTONIO BRITO [address] [place] [address] Gayla Meyer Ph #: [phone] Route: Oral  
 omeprazole (PRILOSEC) 40 mg capsule 0 Sig: Take 1 Cap by mouth daily. Class: Normal  
 Pharmacy: [facility] [address] JUAN ANTONIO BRITO [address] [place] [address] Gayla Meyer Ph #: [phone] Route: Oral  
  
We Performed the Following REFERRAL TO CARDIOLOGY [ZES56 Custom] Follow-up Instructions Return pending outcomes. To-Do List   
 05/14/2018 Imaging:  XR SPINE CERV 4 OR 5 V Referral Information Referral ID Referred By Referred To  
  
 [id number] MD   
   [facility] Phone: [phone] Fax: [phone] Visits Status Start Date End Date 1 New Request 5/14/18 5/14/19 If your referral has a status of pending review or denied, additional information will be sent to support the outcome of this decision. Patient Instructions Body Mass Index: Care Instructions Your Care Instructions Body mass index (BMI) can help you see if your weight is raising your risk for health problems. It uses a formula to compare how much you weigh with how tall you are. · A BMI lower than 18.5 is considered underweight. · A BMI between 18.5 and 24.9 is considered healthy. · A BMI between 25 and 29.9 is considered overweight. A BMI of 30 or higher is considered obese. If your BMI is in the normal range, it means that you have a lower risk for weight-related health problems.  If your BMI is in the overweight or obese range, you may be at increased risk for weight-related health problems, such as high blood pressure, heart disease, stroke, arthritis or joint pain, and diabetes. If your BMI is in the underweight range, you may be at increased risk for health problems such as fatigue, lower protection (immunity) against illness, muscle loss, bone loss, hair loss, and hormone problems. BMI is just one measure of your risk for weight-related health problems. You may be at higher risk for health problems if you are not active, you eat an unhealthy diet, or you drink too much alcohol or use tobacco products. Follow-up care is a key part of your treatment and safety. Be sure to make and go to all appointments, and call your doctor if you are having problems. It's also a good idea to know your test results and keep a list of the medicines you take. How can you care for yourself at home? · Practice healthy eating habits. This includes eating plenty of fruits, vegetables, whole grains, lean protein, and low-fat dairy. · If your doctor recommends it, get more exercise. Walking is a good choice. Bit by bit, increase the amount you walk every day. Try for at least 30 minutes on most days of the week. · Do not smoke. Smoking can increase your risk for health problems. If you need help quitting, talk to your doctor about stop-smoking programs and medicines. These can increase your chances of quitting for good. · Limit alcohol to 2 drinks a day for men and 1 drink a day for women. Too much alcohol can cause health problems. If you have a BMI higher than 25 · Your doctor may do other tests to check your risk for weight-related health problems. This may include measuring the distance around your waist. A waist measurement of more than 40 inches in men or 35 inches in women can increase the risk of weight-related health problems. · Talk with your doctor about steps you can take to stay healthy or improve your health.  You may need to make lifestyle changes to lose weight and stay healthy, such as changing your diet and getting regular exercise. If you have a BMI lower than 18.5 · Your doctor may do other tests to check your risk for health problems. · Talk with your doctor about steps you can take to stay healthy or improve your health. You may need to make lifestyle changes to gain or maintain weight and stay healthy, such as getting more healthy foods in your diet and doing exercises to build muscle. Where can you learn more? Go to http://kenney-karina.info/. Enter S176 in the search box to learn more about \"Body Mass Index: Care Instructions. \" Current as of: October 13, 2016 Content Version: 11.4 © 0534-1578 [employer]. Care instructions adapted under license by [employer] (which disclaims liability or warranty for this information). If you have questions about a medical condition or this instruction, always ask your healthcare professional. [address] any warranty or liability for your use of this information. Introducing [facility]! Dear Khari Tomas: 
Thank you for requesting a [employer] Momit account. Our records indicate that you already have an active [employer] Mom[employer] account. You can access your account anytime at [email or web address]. [email or web address]/"[email or web address] Did you know that you can access your hospital and ER discharge instructions at any time in Green Momit? You can also review all of your test results from your hospital stay or ER visit. Additional Information If you have questions, please visit the Frequently Asked Questions section of the [employer] website at [email or web address]. [email or web address]/"[employer]/. Remember, [employer] Momit is NOT to be used for urgent needs. For medical emergencies, dial 911. Now available from your iPhone and Android! Please provide this summary of care documentation to your next provider. Your primary care clinician is listed as Sarahy León.  If you have any questions after today's visit, please call [phone].

## 2018-05-14 NOTE — PATIENT INSTRUCTIONS
Body Mass Index: Care Instructions  Your Care Instructions    Body mass index (BMI) can help you see if your weight is raising your risk for health problems. It uses a formula to compare how much you weigh with how tall you are. · A BMI lower than 18.5 is considered underweight. · A BMI between 18.5 and 24.9 is considered healthy. · A BMI between 25 and 29.9 is considered overweight. A BMI of 30 or higher is considered obese. If your BMI is in the normal range, it means that you have a lower risk for weight-related health problems. If your BMI is in the overweight or obese range, you may be at increased risk for weight-related health problems, such as high blood pressure, heart disease, stroke, arthritis or joint pain, and diabetes. If your BMI is in the underweight range, you may be at increased risk for health problems such as fatigue, lower protection (immunity) against illness, muscle loss, bone loss, hair loss, and hormone problems. BMI is just one measure of your risk for weight-related health problems. You may be at higher risk for health problems if you are not active, you eat an unhealthy diet, or you drink too much alcohol or use tobacco products. Follow-up care is a key part of your treatment and safety. Be sure to make and go to all appointments, and call your doctor if you are having problems. It's also a good idea to know your test results and keep a list of the medicines you take. How can you care for yourself at home? · Practice healthy eating habits. This includes eating plenty of fruits, vegetables, whole grains, lean protein, and low-fat dairy. · If your doctor recommends it, get more exercise. Walking is a good choice. Bit by bit, increase the amount you walk every day. Try for at least 30 minutes on most days of the week. · Do not smoke. Smoking can increase your risk for health problems. If you need help quitting, talk to your doctor about stop-smoking programs and medicines. These can increase your chances of quitting for good. · Limit alcohol to 2 drinks a day for men and 1 drink a day for women. Too much alcohol can cause health problems. If you have a BMI higher than 25  · Your doctor may do other tests to check your risk for weight-related health problems. This may include measuring the distance around your waist. A waist measurement of more than 40 inches in men or 35 inches in women can increase the risk of weight-related health problems. · Talk with your doctor about steps you can take to stay healthy or improve your health. You may need to make lifestyle changes to lose weight and stay healthy, such as changing your diet and getting regular exercise. If you have a BMI lower than 18.5  · Your doctor may do other tests to check your risk for health problems. · Talk with your doctor about steps you can take to stay healthy or improve your health. You may need to make lifestyle changes to gain or maintain weight and stay healthy, such as getting more healthy foods in your diet and doing exercises to build muscle. Where can you learn more? Go to http://kenney-karina.info/. Enter S176 in the search box to learn more about \"Body Mass Index: Care Instructions. \"  Current as of: October 13, 2016  Content Version: 11.4  © 1994-7120 [employer]. Care instructions adapted under license by [employer] (which disclaims liability or warranty for this information). If you have questions about a medical condition or this instruction, always ask your healthcare professional. [address] any warranty or liability for your use of this information.

## 2018-06-18 ENCOUNTER — TELEPHONE (OUTPATIENT)
Dept: CARDIOLOGY CLINIC | Age: 58
End: 2018-06-18

## 2018-06-18 NOTE — TELEPHONE ENCOUNTER
Referral for patient to see Dr. Júnior Degroot, however patient is declining an appointment with us currently. She states she will call if she decided to make appt with us.

## 2018-07-12 ENCOUNTER — PATIENT OUTREACH (OUTPATIENT)
Dept: FAMILY MEDICINE CLINIC | Age: 58
End: 2018-07-12

## 2018-07-12 NOTE — PROGRESS NOTES
Patient has had no further hospital or ED admissions with in 30 days. Patient has attended [facility]  follow up with PCP Yes  Patient has attended follow up with specialist N/A  Patient is taking all medications as directed. Goals met. Navigation type closed. Episode resolved. Next follow up scheduled if any N/A. Pt has NN direct contact information for questions.

## 2018-08-27 RX ORDER — HYDROXYZINE 25 MG/1
25 TABLET, FILM COATED ORAL
Qty: 90 TAB | Refills: 0 | OUTPATIENT
Start: 2018-08-27

## 2018-08-27 NOTE — TELEPHONE ENCOUNTER
From: Fabian Oliveira  To:  Dilip Tolliver MD  Sent: 8/27/2018 4:18 PM EDT  Subject: Medication Renewal Request    Original authorizing provider: MD Fabian Ordoñez would like a refill of the following medications:  hydrOXYzine HCl (ATARAX) 25 mg tablet Dilip Tolliver MD]    Preferred pharmacy: [address] [place] [address], [address] [place] [address]    Comment:

## 2018-08-27 NOTE — TELEPHONE ENCOUNTER
From: Denys Landau  To: Shavon Aguiar MD  Sent: 8/27/2018 11:07 AM EDT  Subject: Medication Renewal Request    Original authorizing provider: Beverlee Fix, MD Denys Landau would like a refill of the following medications:  hydrOXYzine HCl (ATARAX) 25 mg tablet Shavon Aguiar MD]    Preferred pharmacy: [address] [place] [address]    Comment:  Requesting a refill on Hydroxyzine please.

## 2018-08-29 RX ORDER — HYDROXYZINE 25 MG/1
25 TABLET, FILM COATED ORAL
Qty: 90 TAB | Refills: 0 | Status: SHIPPED | OUTPATIENT
Start: 2018-08-29

## 2019-08-29 NOTE — TELEPHONE ENCOUNTER
Last of 5-14-18  No future appointments. [de-identified] : 2 cm right and 4 cm left thyroid nodule, well circumscribed and mobile and soft [Laryngoscopy Performed] : laryngoscopy was performed, see procedure section for findings [Midline] : located in midline position [Normal] : orientation to person, place, and time: normal

## 2022-02-09 ENCOUNTER — TELEPHONE (OUTPATIENT)
Dept: ORTHOPEDIC SURGERY | Age: 62
End: 2022-02-09

## 2023-01-16 NOTE — TELEPHONE ENCOUNTER
Patient stated that her omeprazole medication is not working for her and would like to know what would be the next step. Asking to be called back. breastfeeding exclusively Ambulatory